# Patient Record
Sex: MALE | Race: WHITE | NOT HISPANIC OR LATINO | Employment: FULL TIME | URBAN - NONMETROPOLITAN AREA
[De-identification: names, ages, dates, MRNs, and addresses within clinical notes are randomized per-mention and may not be internally consistent; named-entity substitution may affect disease eponyms.]

---

## 2018-05-18 ENCOUNTER — OFFICE VISIT (OUTPATIENT)
Dept: OBGYN CLINIC | Facility: CLINIC | Age: 68
End: 2018-05-18
Payer: OTHER MISCELLANEOUS

## 2018-05-18 VITALS
HEIGHT: 72 IN | WEIGHT: 224 LBS | SYSTOLIC BLOOD PRESSURE: 171 MMHG | BODY MASS INDEX: 30.34 KG/M2 | HEART RATE: 66 BPM | DIASTOLIC BLOOD PRESSURE: 101 MMHG

## 2018-05-18 DIAGNOSIS — M75.121 COMPLETE TEAR OF RIGHT ROTATOR CUFF: Primary | ICD-10-CM

## 2018-05-18 PROCEDURE — 99203 OFFICE O/P NEW LOW 30 MIN: CPT | Performed by: ORTHOPAEDIC SURGERY

## 2018-05-18 RX ORDER — CHLORHEXIDINE GLUCONATE 4 G/100ML
SOLUTION TOPICAL DAILY PRN
Status: CANCELLED | OUTPATIENT
Start: 2018-05-18

## 2018-05-18 RX ORDER — NAPROXEN 500 MG/1
500 TABLET ORAL 2 TIMES DAILY WITH MEALS
COMMUNITY
End: 2018-08-13 | Stop reason: HOSPADM

## 2018-05-18 RX ORDER — OMEPRAZOLE 20 MG/1
20 CAPSULE, DELAYED RELEASE ORAL DAILY
COMMUNITY

## 2018-05-18 NOTE — PATIENT INSTRUCTIONS
Pre-Surgery Instructions:   Medication Instructions    naproxen (NAPROSYN) 500 mg tablet Stop taking 3 days prior to surgery   Rotator Cuff Tear Repair   WHAT YOU NEED TO KNOW:   Rotator cuff repair is surgery to fix a tear in one or more of your rotator cuff tendons  A tendon is a cord of tough tissue that connects your muscles to your bones  The rotator cuff is made up of a group of muscles and tendons that hold the shoulder joint in place  DISCHARGE INSTRUCTIONS:   Medicines:   · Antibiotics: This medicine is given to fight or prevent an infection caused by bacteria  Always take your antibiotics exactly as ordered by your healthcare provider  Do not stop taking your medicine unless directed by your healthcare provider  Never save antibiotics or take leftover antibiotics that were given to you for another illness  · Pain medicine: You may be given medicine to take away or decrease pain  Do not wait until the pain is severe before you take your medicine  · NSAIDs , such as ibuprofen, help decrease swelling, pain, and fever  This medicine is available with or without a doctor's order  NSAIDs can cause stomach bleeding or kidney problems in certain people  If you take blood thinner medicine, always ask your healthcare provider if NSAIDs are safe for you  Always read the medicine label and follow directions  · Take your medicine as directed  Contact your healthcare provider if you think your medicine is not helping or if you have side effects  Tell him or her if you are allergic to any medicine  Keep a list of the medicines, vitamins, and herbs you take  Include the amounts, and when and why you take them  Bring the list or the pill bottles to follow-up visits  Carry your medicine list with you in case of an emergency  Follow up with your healthcare provider as directed: Ask when you should return to have your stitches taken out   Write down your questions so you remember to ask them during your visits  Care for your wound:  Keep your shoulder wound clean and dry  Ask how to care for the wound, and if you may get it wet  Ice your shoulder: Ice may decrease pain, swelling, and muscle spasms  Use an ice pack, or put crushed ice in a plastic bag  Cover it with a towel and place it on your shoulder for 15 to 20 minutes every hour or as directed  Use a sling: You may need to use an abduction immobilizer sling for 4 to 6 weeks after surgery  This sling stops your arm from moving  The pillow attached to the sling holds your arm away from your body  This position decreases pressure on your wound, and helps blood flow to the surgery area, which may help the wound heal    Physical therapy:   · Your physical therapy may begin soon after surgery  At first, a physical therapist will work with you to do safe exercises that will allow your rotator cuff to heal  Do not use your arm to lift anything  Do not use your arm to move around, push yourself up from lying down, or to change positions  After a few weeks, the therapist may suggest therapy in the pool  The water allows you to move your arm with very little stress on your shoulder  · Over 2 to 3 months, you will do exercises that include using your shoulder more  You will begin exercises such as raising and stretching your arm  Do only those exercises that you have been told to do, and only as often as you are told to do them  Over time, you will begin doing exercises that make your shoulder muscles stronger  After 4 to 6 months, you may be able to begin activities that require you to lift your arm overhead, such as tennis and other racquet sports  It may take up to a year to return to all of your regular daily activities after you have a rotator cuff tear repair  Contact your healthcare provider if:   · Your surgery area is swollen, red, or has pus coming from it  · You have chills, a cough, or feel weak and achy  · You have a fever      · You have stiffness or pain in your shoulder that is worse and making you stop your physical therapy  · You are vomiting  · Your skin is itchy, swollen, or has a rash  · You have questions or concerns about your condition or care  Seek care immediately or call 911 if:   · You suddenly have shortness of breath  · The stitches on your shoulder wound come apart  · You have new shoulder pain that does not go away, even after you take pain medicine  · You have sudden numbness or tingling down your arm  © 2017 [employer]  Information is for End User's use only and may not be sold, redistributed or otherwise used for commercial purposes  All illustrations and images included in CareNotes® are the copyrighted property of [employer]  or [address]  The above information is an  only  It is not intended as medical advice for individual conditions or treatments  Talk to your doctor, nurse or pharmacist before following any medical regimen to see if it is safe and effective for you     omeprazole (PriLOSEC) 20 mg delayed release capsule per anesthesia guidelines

## 2018-05-18 NOTE — PROGRESS NOTES
Chief Complaint  Right shoulder pain 2 weeks    History Of Presenting Illness  Radha Watt 1950 presents with right shoulder pain 2 weeks  Patient is left handed   Patient was coming down the ladder 2 weeks ago at work  He slipped and fell and to stop himself falling he grabbed the railing with his aright hand  Patient developed acute pain and weakness in his right shoulder  Patient was seen seen and treated by workman's comp  Patient presents for evaluation with an MRI  His pain and swelling has decreased in the right shoulder  His range of motion has improved  Current Medications  Current Outpatient Prescriptions   Medication Sig Dispense Refill    naproxen (NAPROSYN) 500 mg tablet Take 500 mg by mouth 2 (two) times a day with meals      omeprazole (PriLOSEC) 20 mg delayed release capsule Take 20 mg by mouth daily       No current facility-administered medications for this visit  Current Problems    Active Problems: There are no active problems to display for this patient          Review of Systems:    General: negative for - chills, fatigue, fever,  weight gain or weight loss  Psychological: negative for - anxiety, behavioral disorder, concentration difficulties, decreased libido, depression, irritability, memory difficulties, mood swings, sleep disturbances or suicidal ideation  Ophthalmic: negative for - blurry vision, decreased vision, double vision,  ENT: negative for - hearing difficulties , nasal congestion, nasal discharge, oral lesions, sinus pain, sneezing, sore throat, tinnitus, vertigo or visual changes  Allergy and Immunology: negative for - hives, insect bite sensitivity,  Hematological and Lymphatic: negative for - bleeding problems, blood clots,bruising, swollen lymph nodes  Endocrine: negative for - hair pattern changes, hot flashes, malaise/lethargy, mood swings, palpitations, polydipsia/polyuria, skin changes, temperature intolerance or unexpected weight change  Respiratory: negative for - cough, hemoptysis, orthopnea, shortness of breath, sputum changes, tachypnea or wheezing  Cardiovascular: negative for - chest pain, dyspnea on exertion, edema, irregular heartbeat, murmur, orthopnea, palpitations, paroxysmal nocturnal dyspnea or rapid heart rate  Gastrointestinal: negative for - abdominal pain,  blood in stools, change in bowel habits, change in stools, constipation, diarrhea, gas/bloating, heartburn, hematemesis, melena, nausea/vomiting, stool incontinence or swallowing difficulty/pain  Genito-Urinary: negative for - dysuria, incontinence, irregular/heavy menses or urinary frequency/urgency  Musculoskeletal: negative for - gait disturbance, joint pain, joint stiffness, joint swelling, muscle pain, muscular weakness  Dermatological: negative fo lumps, mole changes, nail changes, pruritus, rash and skin lesion changes  Neurological: negative for confusion, dizziness, headaches, impaired coordination/balance, memory loss, numbness/tingling, seizures, speech problems, tremors or weakness    Past Medical History:   Past Medical History:   Diagnosis Date    Stomach disorder        Past Surgical History:   No past surgical history on file  Family History:  Family history reviewed and non-contributory  Family History   Problem Relation Age of Onset    Cancer Mother     Lung disease Father     Cancer Sister        Social History:  Social History     Social History    Marital status: /Civil Union     Spouse name: N/A    Number of children: N/A    Years of education: N/A     Social History Main Topics    Smoking status: Former Smoker    Smokeless tobacco: Never Used    Alcohol use None    Drug use: Unknown    Sexual activity: Not Asked     Other Topics Concern    None     Social History Narrative    None       Allergies:    Allergies   Allergen Reactions    Penicillins Hives           Physical ExaminationBP (!) 171/101   Pulse 66   Ht 6' (1 829 m)   Wt 102 kg (224 lb)   BMI 30 38 kg/m²   Gen: Alert and oriented to person, place, time  HEENT: EOMI, eyes clear, moist mucus membranes, hearing intact  Respiratory: Bilateral chest rise  No audible wheezing found  Cardiovascular: Regular Rate and Rhythm  Abdomen: soft nontender/nondistended    Orthopedic Exam  Right shoulder no obvious swelling or deformity  Slight wasting of his cuff muscles  Right shoulder active forward flexion of 90° AB duction of 80°  Cuff strength right shoulder supraspinatus infraspinatus external rotation 2+ out of 5  Internal rotation strength maintained  MRI shows massive cuff tear with retraction of the glenoid rim          Impression  Right shoulder complete rotator cuff tear      Plan    Discussed treatment with the patient  Patient is very active and still at work my recommendation is surgical repair of the rotator cuff  Given the degree of retraction I may not be able to obtain a complete repair and he might require further intervention down the line    Discussed treatment options with the patient  Options are to do nothing, continue nonoperative measures, last resort  is surgical intervention  This patient has failed nonoperative measures and has  opted for surgical intervention  Risk and  benefits of treatment options discussed in detail  Risks of surgery include risk of infection, bleeding, injury to the vessels and risk of failure of the procedure, potential risk of loss of life and limb  After weighing up all treatment options available  Patient has  opted for surgical intervention and informed consent obtained          Cristobal Chen MD        Portions of the record may have been created with voice recognition software   Occasional wrong word or "sound a like" substitutions may have occurred due to the inherent limitations of voice recognition software   Read the chart carefully and recognize, using context, where substitutions have occurred

## 2018-05-18 NOTE — H&P
Chief Complaint  Right shoulder pain 2 weeks    History Of Presenting Illness  Rajan Cruz 1950 presents with right shoulder pain 2 weeks  Patient is left handed   Patient was coming down the ladder 2 weeks ago at work  He slipped and fell and to stop himself falling he grabbed the railing with his aright hand  Patient developed acute pain and weakness in his right shoulder  Patient was seen seen and treated by workman's comp  Patient presents for evaluation with an MRI  His pain and swelling has decreased in the right shoulder  His range of motion has improved  Current Medications  Current Outpatient Prescriptions   Medication Sig Dispense Refill    naproxen (NAPROSYN) 500 mg tablet Take 500 mg by mouth 2 (two) times a day with meals      omeprazole (PriLOSEC) 20 mg delayed release capsule Take 20 mg by mouth daily       No current facility-administered medications for this visit  Current Problems    Active Problems: There are no active problems to display for this patient          Review of Systems:    General: negative for - chills, fatigue, fever,  weight gain or weight loss  Psychological: negative for - anxiety, behavioral disorder, concentration difficulties, decreased libido, depression, irritability, memory difficulties, mood swings, sleep disturbances or suicidal ideation  Ophthalmic: negative for - blurry vision, decreased vision, double vision,  ENT: negative for - hearing difficulties , nasal congestion, nasal discharge, oral lesions, sinus pain, sneezing, sore throat, tinnitus, vertigo or visual changes  Allergy and Immunology: negative for - hives, insect bite sensitivity,  Hematological and Lymphatic: negative for - bleeding problems, blood clots,bruising, swollen lymph nodes  Endocrine: negative for - hair pattern changes, hot flashes, malaise/lethargy, mood swings, palpitations, polydipsia/polyuria, skin changes, temperature intolerance or unexpected weight change  Respiratory: negative for - cough, hemoptysis, orthopnea, shortness of breath, sputum changes, tachypnea or wheezing  Cardiovascular: negative for - chest pain, dyspnea on exertion, edema, irregular heartbeat, murmur, orthopnea, palpitations, paroxysmal nocturnal dyspnea or rapid heart rate  Gastrointestinal: negative for - abdominal pain,  blood in stools, change in bowel habits, change in stools, constipation, diarrhea, gas/bloating, heartburn, hematemesis, melena, nausea/vomiting, stool incontinence or swallowing difficulty/pain  Genito-Urinary: negative for - dysuria, incontinence, irregular/heavy menses or urinary frequency/urgency  Musculoskeletal: negative for - gait disturbance, joint pain, joint stiffness, joint swelling, muscle pain, muscular weakness  Dermatological: negative fo lumps, mole changes, nail changes, pruritus, rash and skin lesion changes  Neurological: negative for confusion, dizziness, headaches, impaired coordination/balance, memory loss, numbness/tingling, seizures, speech problems, tremors or weakness    Past Medical History:   Past Medical History:   Diagnosis Date    Stomach disorder        Past Surgical History:   No past surgical history on file  Family History:  Family history reviewed and non-contributory  Family History   Problem Relation Age of Onset    Cancer Mother     Lung disease Father     Cancer Sister        Social History:  Social History     Social History    Marital status: /Civil Union     Spouse name: N/A    Number of children: N/A    Years of education: N/A     Social History Main Topics    Smoking status: Former Smoker    Smokeless tobacco: Never Used    Alcohol use None    Drug use: Unknown    Sexual activity: Not Asked     Other Topics Concern    None     Social History Narrative    None       Allergies:    Allergies   Allergen Reactions    Penicillins Hives           Physical ExaminationBP (!) 171/101   Pulse 66   Ht 6' (1 829 m)   Wt 102 kg (224 lb)   BMI 30 38 kg/m²    Gen: Alert and oriented to person, place, time  HEENT: EOMI, eyes clear, moist mucus membranes, hearing intact  Respiratory: Bilateral chest rise  No audible wheezing found  Cardiovascular: Regular Rate and Rhythm  Abdomen: soft nontender/nondistended    Orthopedic Exam  Right shoulder no obvious swelling or deformity  Slight wasting of his cuff muscles  Right shoulder active forward flexion of 90° AB duction of 80°  Cuff strength right shoulder supraspinatus infraspinatus external rotation 2+ out of 5  Internal rotation strength maintained  MRI shows massive cuff tear with retraction of the glenoid rim          Impression  Right shoulder complete rotator cuff tear      Plan    Discussed treatment with the patient  Patient is very active and still at work my recommendation is surgical repair of the rotator cuff  Given the degree of retraction I may not be able to obtain a complete repair and he might require further intervention down the line    Discussed treatment options with the patient  Options are to do nothing, continue nonoperative measures, last resort  is surgical intervention  This patient has failed nonoperative measures and has  opted for surgical intervention  Risk and  benefits of treatment options discussed in detail  Risks of surgery include risk of infection, bleeding, injury to the vessels and risk of failure of the procedure, potential risk of loss of life and limb  After weighing up all treatment options available  Patient has  opted for surgical intervention and informed consent obtained          Leo Case MD        Portions of the record may have been created with voice recognition software   Occasional wrong word or "sound a like" substitutions may have occurred due to the inherent limitations of voice recognition software   Read the chart carefully and recognize, using context, where substitutions have occurred

## 2018-06-05 ENCOUNTER — HOSPITAL ENCOUNTER (OUTPATIENT)
Dept: RADIOLOGY | Facility: HOSPITAL | Age: 68
Discharge: HOME | End: 2018-06-05
Attending: ORTHOPAEDIC SURGERY
Payer: COMMERCIAL

## 2018-06-05 ENCOUNTER — HOSPITAL ENCOUNTER (OUTPATIENT)
Dept: NON INVASIVE DIAGNOSTICS | Facility: HOSPITAL | Age: 68
Discharge: HOME | End: 2018-06-05
Attending: ORTHOPAEDIC SURGERY
Payer: COMMERCIAL

## 2018-06-05 ENCOUNTER — APPOINTMENT (OUTPATIENT)
Dept: LAB | Facility: HOSPITAL | Age: 68
End: 2018-06-05
Attending: ORTHOPAEDIC SURGERY
Payer: COMMERCIAL

## 2018-06-05 DIAGNOSIS — M75.121 COMPLETE TEAR OF RIGHT ROTATOR CUFF: ICD-10-CM

## 2018-06-05 LAB
ANION GAP SERPL CALCULATED.3IONS-SCNC: 9 MMOL/L (ref 4–13)
APTT PPP: 32 SECONDS (ref 24–36)
BASOPHILS # BLD AUTO: 0.04 THOUSANDS/ΜL (ref 0–0.1)
BASOPHILS NFR BLD AUTO: 1 % (ref 0–1)
BILIRUB UR QL STRIP: NEGATIVE
BUN SERPL-MCNC: 13 MG/DL (ref 5–25)
CALCIUM SERPL-MCNC: 9.1 MG/DL (ref 8.3–10.1)
CHLORIDE SERPL-SCNC: 104 MMOL/L (ref 100–108)
CLARITY UR: CLEAR
CO2 SERPL-SCNC: 26 MMOL/L (ref 21–32)
COLOR UR: YELLOW
CREAT SERPL-MCNC: 1.1 MG/DL (ref 0.6–1.3)
EOSINOPHIL # BLD AUTO: 0.08 THOUSAND/ΜL (ref 0–0.61)
EOSINOPHIL NFR BLD AUTO: 2 % (ref 0–6)
ERYTHROCYTE [DISTWIDTH] IN BLOOD BY AUTOMATED COUNT: 13.4 % (ref 11.6–15.1)
GFR SERPL CREATININE-BSD FRML MDRD: 70 ML/MIN/1.73SQ M
GLUCOSE SERPL-MCNC: 105 MG/DL (ref 65–140)
GLUCOSE UR STRIP-MCNC: NEGATIVE MG/DL
HCT VFR BLD AUTO: 42.8 % (ref 36.5–49.3)
HGB BLD-MCNC: 15.3 G/DL (ref 12–17)
HGB UR QL STRIP.AUTO: NEGATIVE
INR PPP: 1.14 (ref 0.86–1.17)
KETONES UR STRIP-MCNC: NEGATIVE MG/DL
LEUKOCYTE ESTERASE UR QL STRIP: NEGATIVE
LYMPHOCYTES # BLD AUTO: 1.13 THOUSANDS/ΜL (ref 0.6–4.47)
LYMPHOCYTES NFR BLD AUTO: 22 % (ref 14–44)
MCH RBC QN AUTO: 33 PG (ref 26.8–34.3)
MCHC RBC AUTO-ENTMCNC: 35.7 G/DL (ref 31.4–37.4)
MCV RBC AUTO: 92 FL (ref 82–98)
MONOCYTES # BLD AUTO: 0.47 THOUSAND/ΜL (ref 0.17–1.22)
MONOCYTES NFR BLD AUTO: 9 % (ref 4–12)
NEUTROPHILS # BLD AUTO: 3.43 THOUSANDS/ΜL (ref 1.85–7.62)
NEUTS SEG NFR BLD AUTO: 67 % (ref 43–75)
NITRITE UR QL STRIP: NEGATIVE
PH UR STRIP.AUTO: 5.5 [PH] (ref 4.5–8)
PLATELET # BLD AUTO: 239 THOUSANDS/UL (ref 149–390)
PMV BLD AUTO: 9.4 FL (ref 8.9–12.7)
POTASSIUM SERPL-SCNC: 4.8 MMOL/L (ref 3.5–5.3)
PROT UR STRIP-MCNC: NEGATIVE MG/DL
PROTHROMBIN TIME: 14.1 SECONDS (ref 11.8–14.2)
RBC # BLD AUTO: 4.63 MILLION/UL (ref 3.88–5.62)
SODIUM SERPL-SCNC: 139 MMOL/L (ref 136–145)
SP GR UR STRIP.AUTO: 1.02 (ref 1–1.03)
UROBILINOGEN UR QL STRIP.AUTO: 0.2 E.U./DL
WBC # BLD AUTO: 5.15 THOUSAND/UL (ref 4.31–10.16)

## 2018-06-05 PROCEDURE — 93005 ELECTROCARDIOGRAM TRACING: CPT

## 2018-06-05 PROCEDURE — 85610 PROTHROMBIN TIME: CPT

## 2018-06-05 PROCEDURE — 85730 THROMBOPLASTIN TIME PARTIAL: CPT

## 2018-06-05 PROCEDURE — 81003 URINALYSIS AUTO W/O SCOPE: CPT | Performed by: ORTHOPAEDIC SURGERY

## 2018-06-05 PROCEDURE — 85025 COMPLETE CBC W/AUTO DIFF WBC: CPT

## 2018-06-05 PROCEDURE — 71046 X-RAY EXAM CHEST 2 VIEWS: CPT

## 2018-06-05 PROCEDURE — 36415 COLL VENOUS BLD VENIPUNCTURE: CPT

## 2018-06-05 PROCEDURE — 80048 BASIC METABOLIC PNL TOTAL CA: CPT

## 2018-06-06 LAB
ATRIAL RATE: 59 BPM
P AXIS: 47 DEGREES
PR INTERVAL: 144 MS
QRS AXIS: -23 DEGREES
QRSD INTERVAL: 88 MS
QT INTERVAL: 412 MS
QTC INTERVAL: 407 MS
T WAVE AXIS: 26 DEGREES
VENTRICULAR RATE: 59 BPM

## 2018-06-06 PROCEDURE — 93010 ELECTROCARDIOGRAM REPORT: CPT | Performed by: INTERNAL MEDICINE

## 2018-07-20 ENCOUNTER — OFFICE VISIT (OUTPATIENT)
Dept: OBGYN CLINIC | Facility: CLINIC | Age: 68
End: 2018-07-20
Payer: OTHER MISCELLANEOUS

## 2018-07-20 VITALS
HEART RATE: 73 BPM | BODY MASS INDEX: 29.39 KG/M2 | WEIGHT: 217 LBS | HEIGHT: 72 IN | DIASTOLIC BLOOD PRESSURE: 84 MMHG | SYSTOLIC BLOOD PRESSURE: 188 MMHG

## 2018-07-20 DIAGNOSIS — M75.121 COMPLETE TEAR OF RIGHT ROTATOR CUFF: Primary | ICD-10-CM

## 2018-07-20 PROCEDURE — 99213 OFFICE O/P EST LOW 20 MIN: CPT | Performed by: ORTHOPAEDIC SURGERY

## 2018-07-20 NOTE — LETTER
July 20, 2018     Patient: Penny Perez   YOB: 1950   Date of Visit: 7/20/2018       To Whom It May Concern: It is my medical opinion that Penny Perez may return to work on aug 15th 2018  Patient might need surgery on his right shoulder by means of an arthroscopic repair  Patient will continue physical therapy    If you have any questions or concerns, please don't hesitate to call           Sincerely,        Charlene Herrera MD    CC: Penny Perez

## 2018-07-20 NOTE — PATIENT INSTRUCTIONS
Exercises for Shoulder Abduction and Adduction   AMBULATORY CARE:   Shoulder abduction and adduction exercises  work the muscles at the back of your shoulder and your upper back  Before you exercise:  Warm up and stretch before you exercise  Walk or ride a stationary bike for 5 to 10 minutes to help you warm up  Stretching helps increase range of motion  It may also decrease muscle soreness and help prevent another injury  Your healthcare provider will tell you which of the following stretches to do:  · Crossover arm stretch:  Relax your shoulders  Hold your upper arm with the opposite hand  Pull your arm across your chest until you feel a stretch  Hold the stretch for 30 seconds  Return to the starting position  · Shoulder flexion stretch:  Stand facing a wall  Slowly walk your fingers up the wall until you feel a stretch  Hold the stretch for 30 seconds  Return to the starting position  · Sleeper stretch:  Lie on your injured side on a firm, flat surface  Bend the elbow of your injured arm 90° with your hand facing up  Use your arm that is not injured to slowly push your injured arm down  Stop when you feel a stretch at the back of your injured shoulder  Hold the stretch for 30 seconds  Slowly return to the starting position  Contact your healthcare provider if:   · You have sharp or worsening pain during exercise or at rest     · You have questions or concerns about your shoulder exercises  How to exercise with a weight:  Your healthcare provider will tell you how much weight to use  · Shoulder abduction:  Stand and hold a weight in your hand with your palm facing your body  Slowly raise your arm to the side with your thumb pointing up  Then raise your arm over your head as far as you can without pain  Hold this position for as long as directed  Do not raise your arm over your head unless your healthcare provider says it is okay  · Shoulder adduction:  Lie on your back on a firm surface   Extend your arm out to a "T " Bend your elbow so your forearm in the air  Hold a weight in your hand  Slowly raise your arm toward the ceiling and straighten your elbow  Hold this position for as long as directed  Slowly return to the starting position  How to exercise with an exercise band:   · Shoulder abduction:  Wrap the exercise band around a heavy, stable object near your foot  Grab the band with the hand of your injured shoulder  Keep your arm straight  Slowly raise your arm to the side with your thumb pointing up  Then, slowly pull the band over your head as far as you can without pain  Do not raise your arm over your head unless your healthcare provider says it is okay  Do not let your shoulder shrug  Hold this position for as long as directed  Slowly return to the starting position  · Shoulder adduction:  Wrap the exercise band around a heavy, stable object  Stand and face away from where the band is anchored  Hold each end of the band in both hands with your elbows bent  Your elbows should not be behind your body  Keep your arms parallel to the floor and slowly straighten your elbows  Hold this position for as long as directed  Slowly return to the starting position  Follow up with your physical therapist as directed:  Write down your questions so you remember to ask them at your visits  © 2017 [employer]  Information is for End User's use only and may not be sold, redistributed or otherwise used for commercial purposes  All illustrations and images included in CareNotes® are the copyrighted property of [employer]  or [address]  The above information is an  only  It is not intended as medical advice for individual conditions or treatments  Talk to your doctor, nurse or pharmacist before following any medical regimen to see if it is safe and effective for you

## 2018-07-20 NOTE — PROGRESS NOTES
Chief Complaint  Right shoulder pain and weakness    History Of Presenting Illness  Karla Nicolas 1950 presents with right shoulder pain and weakness due to rotator cuff tear by clinical exam and MRI  Patient was scheduled for an arthroscopic repair  Patient is afraid of surgery and cancel his procedure  Patient presents for evaluation today  He has regained his range of motion  His pain is decreased  His weakness persists      Current Medications  Current Outpatient Prescriptions   Medication Sig Dispense Refill    naproxen (NAPROSYN) 500 mg tablet Take 500 mg by mouth 2 (two) times a day with meals      omeprazole (PriLOSEC) 20 mg delayed release capsule Take 20 mg by mouth daily      valsartan (DIOVAN) 160 mg tablet Take 160 mg by mouth daily       No current facility-administered medications for this visit          Current Problems    Active Problems:   Patient Active Problem List    Diagnosis Date Noted    Complete tear of right rotator cuff 05/18/2018         Review of Systems:    General: negative for - chills, fatigue, fever,  weight gain or weight loss  Psychological: negative for - anxiety, behavioral disorder, concentration difficulties    Neurological: negative for confusion, impaired coordination/balance, memory loss, numbness/tingling, seizures    Past Medical History:   Past Medical History:   Diagnosis Date    GERD (gastroesophageal reflux disease)     Hypertension     Stomach disorder        Past Surgical History:   Past Surgical History:   Procedure Laterality Date    TONSILLECTOMY         Family History:  Family history reviewed and non-contributory  Family History   Problem Relation Age of Onset    Cancer Mother     Lung disease Father     Cancer Sister        Social History:  Social History     Social History    Marital status: /Civil Union     Spouse name: N/A    Number of children: N/A    Years of education: N/A     Social History Main Topics    Smoking status: Former Smoker    Smokeless tobacco: Never Used    Alcohol use Yes      Comment: social    Drug use: Unknown    Sexual activity: Not Asked     Other Topics Concern    None     Social History Narrative    None       Allergies: Allergies   Allergen Reactions    Penicillins Hives           Physical ExaminationBP (!) 188/84   Pulse 73   Ht 6' (1 829 m)   Wt 98 4 kg (217 lb)   BMI 29 43 kg/m²   Gen: Alert and oriented to person, place, time  HEENT: EOMI, eyes clear, moist mucus membranes, hearing intact  Respiratory: Bilateral chest rise  No audible wheezing found  Cardiovascular: Regular Rate and Rhythm  Abdomen: soft nontender/nondistended    Orthopedic Exam  Right shoulder no obvious swelling or deformity  Excellent range of motion  Cuff strength 4/5  Right shows a complete tear of the supraspinatus anterior fibers without any retraction          Impression  Right shoulder rotator cuff tear        Plan    Discussed treatment with the patient and his wife  Patient is right-handed and extremely active and a   My recommendation is a surgical repair  Patient and his wife will think about the options and let me know next week  Patient allowed to go back to his job August 15th, 2018  If the shoulder becomes more symptomatic while at work he will need surgical repair at that stage  Patient will continue physical therapy    Marry Matamoros MD        Portions of the record may have been created with voice recognition software   Occasional wrong word or "sound a like" substitutions may have occurred due to the inherent limitations of voice recognition software   Read the chart carefully and recognize, using context, where substitutions have occurred

## 2018-07-23 ENCOUNTER — TELEPHONE (OUTPATIENT)
Dept: OBGYN CLINIC | Facility: HOSPITAL | Age: 68
End: 2018-07-23

## 2018-07-23 NOTE — TELEPHONE ENCOUNTER
Patient is calling to say he would like to proceed with having surgery  [facility] next [facility] be?

## 2018-07-26 ENCOUNTER — TELEPHONE (OUTPATIENT)
Dept: OBGYN CLINIC | Facility: HOSPITAL | Age: 68
End: 2018-07-26

## 2018-07-26 NOTE — TELEPHONE ENCOUNTER
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

## 2018-08-06 NOTE — PRE-PROCEDURE INSTRUCTIONS
No outpatient prescriptions have been marked as taking for the 8/13/18 encounter [facility] Encounter)

## 2018-08-11 ENCOUNTER — ANESTHESIA EVENT (OUTPATIENT)
Dept: PERIOP | Facility: HOSPITAL | Age: 68
End: 2018-08-11
Payer: COMMERCIAL

## 2018-08-13 ENCOUNTER — HOSPITAL ENCOUNTER (OUTPATIENT)
Facility: HOSPITAL | Age: 68
Setting detail: OUTPATIENT SURGERY
Discharge: HOME | End: 2018-08-13
Attending: ORTHOPAEDIC SURGERY | Admitting: ORTHOPAEDIC SURGERY
Payer: COMMERCIAL

## 2018-08-13 ENCOUNTER — ANESTHESIA (OUTPATIENT)
Dept: PERIOP | Facility: HOSPITAL | Age: 68
End: 2018-08-13
Payer: COMMERCIAL

## 2018-08-13 VITALS
HEART RATE: 66 BPM | DIASTOLIC BLOOD PRESSURE: 67 MMHG | RESPIRATION RATE: 18 BRPM | OXYGEN SATURATION: 96 % | TEMPERATURE: 97.3 F | SYSTOLIC BLOOD PRESSURE: 125 MMHG

## 2018-08-13 DIAGNOSIS — M75.121 COMPLETE TEAR OF RIGHT ROTATOR CUFF: Primary | ICD-10-CM

## 2018-08-13 PROCEDURE — C1713 ANCHOR/SCREW BN/BN,TIS/BN: HCPCS | Performed by: ORTHOPAEDIC SURGERY

## 2018-08-13 PROCEDURE — 29828 SHO ARTHRS SRG BICP TENODSIS: CPT | Performed by: PHYSICIAN ASSISTANT

## 2018-08-13 PROCEDURE — 29827 SHO ARTHRS SRG RT8TR CUF RPR: CPT | Performed by: PHYSICIAN ASSISTANT

## 2018-08-13 PROCEDURE — 29827 SHO ARTHRS SRG RT8TR CUF RPR: CPT | Performed by: ORTHOPAEDIC SURGERY

## 2018-08-13 PROCEDURE — 29828 SHO ARTHRS SRG BICP TENODSIS: CPT | Performed by: ORTHOPAEDIC SURGERY

## 2018-08-13 DEVICE — ANCHOR SUT 4.5 X 14MM BCMPS CRKSCR FLLY THRD: Type: IMPLANTABLE DEVICE | Site: SHOULDER | Status: FUNCTIONAL

## 2018-08-13 DEVICE — IMPLANTABLE DEVICE: Type: IMPLANTABLE DEVICE | Site: SHOULDER | Status: FUNCTIONAL

## 2018-08-13 RX ORDER — FENTANYL CITRATE/PF 50 MCG/ML
50 SYRINGE (ML) INJECTION
Status: DISCONTINUED | OUTPATIENT
Start: 2018-08-13 | End: 2018-08-13 | Stop reason: HOSPADM

## 2018-08-13 RX ORDER — CLINDAMYCIN PHOSPHATE 900 MG/50ML
900 INJECTION INTRAVENOUS ONCE
Status: DISCONTINUED | OUTPATIENT
Start: 2018-08-13 | End: 2018-08-13

## 2018-08-13 RX ORDER — PROPOFOL 10 MG/ML
INJECTION, EMULSION INTRAVENOUS AS NEEDED
Status: DISCONTINUED | OUTPATIENT
Start: 2018-08-13 | End: 2018-08-13 | Stop reason: SURG

## 2018-08-13 RX ORDER — KETOROLAC TROMETHAMINE 30 MG/ML
INJECTION, SOLUTION INTRAMUSCULAR; INTRAVENOUS AS NEEDED
Status: DISCONTINUED | OUTPATIENT
Start: 2018-08-13 | End: 2018-08-13 | Stop reason: SURG

## 2018-08-13 RX ORDER — ONDANSETRON 2 MG/ML
INJECTION INTRAMUSCULAR; INTRAVENOUS AS NEEDED
Status: DISCONTINUED | OUTPATIENT
Start: 2018-08-13 | End: 2018-08-13 | Stop reason: SURG

## 2018-08-13 RX ORDER — OXYCODONE HYDROCHLORIDE AND ACETAMINOPHEN 5; 325 MG/1; MG/1
1 TABLET ORAL EVERY 4 HOURS PRN
Qty: 30 TABLET | Refills: 0 | Status: SHIPPED | OUTPATIENT
Start: 2018-08-13 | End: 2018-09-21 | Stop reason: CLARIF

## 2018-08-13 RX ORDER — ROPIVACAINE HYDROCHLORIDE 5 MG/ML
INJECTION, SOLUTION EPIDURAL; INFILTRATION; PERINEURAL AS NEEDED
Status: DISCONTINUED | OUTPATIENT
Start: 2018-08-13 | End: 2018-08-13 | Stop reason: SURG

## 2018-08-13 RX ORDER — ONDANSETRON 2 MG/ML
4 INJECTION INTRAMUSCULAR; INTRAVENOUS ONCE AS NEEDED
Status: DISCONTINUED | OUTPATIENT
Start: 2018-08-13 | End: 2018-08-13 | Stop reason: HOSPADM

## 2018-08-13 RX ORDER — FENTANYL CITRATE 50 UG/ML
INJECTION, SOLUTION INTRAMUSCULAR; INTRAVENOUS AS NEEDED
Status: DISCONTINUED | OUTPATIENT
Start: 2018-08-13 | End: 2018-08-13 | Stop reason: SURG

## 2018-08-13 RX ORDER — SODIUM CHLORIDE, SODIUM LACTATE, POTASSIUM CHLORIDE, CALCIUM CHLORIDE 600; 310; 30; 20 MG/100ML; MG/100ML; MG/100ML; MG/100ML
125 INJECTION, SOLUTION INTRAVENOUS CONTINUOUS
Status: DISCONTINUED | OUTPATIENT
Start: 2018-08-13 | End: 2018-08-13 | Stop reason: HOSPADM

## 2018-08-13 RX ORDER — ROCURONIUM BROMIDE 10 MG/ML
INJECTION, SOLUTION INTRAVENOUS AS NEEDED
Status: DISCONTINUED | OUTPATIENT
Start: 2018-08-13 | End: 2018-08-13 | Stop reason: SURG

## 2018-08-13 RX ORDER — OXYCODONE HYDROCHLORIDE AND ACETAMINOPHEN 5; 325 MG/1; MG/1
2 TABLET ORAL EVERY 4 HOURS PRN
Status: DISCONTINUED | OUTPATIENT
Start: 2018-08-13 | End: 2018-08-13 | Stop reason: HOSPADM

## 2018-08-13 RX ORDER — MIDAZOLAM HYDROCHLORIDE 1 MG/ML
INJECTION INTRAMUSCULAR; INTRAVENOUS AS NEEDED
Status: DISCONTINUED | OUTPATIENT
Start: 2018-08-13 | End: 2018-08-13 | Stop reason: SURG

## 2018-08-13 RX ORDER — LIDOCAINE HYDROCHLORIDE AND EPINEPHRINE 10; 10 MG/ML; UG/ML
INJECTION, SOLUTION INFILTRATION; PERINEURAL AS NEEDED
Status: DISCONTINUED | OUTPATIENT
Start: 2018-08-13 | End: 2018-08-13 | Stop reason: HOSPADM

## 2018-08-13 RX ORDER — MEPERIDINE HYDROCHLORIDE 25 MG/ML
12.5 INJECTION INTRAMUSCULAR; INTRAVENOUS; SUBCUTANEOUS ONCE AS NEEDED
Status: DISCONTINUED | OUTPATIENT
Start: 2018-08-13 | End: 2018-08-13 | Stop reason: HOSPADM

## 2018-08-13 RX ORDER — SUCCINYLCHOLINE CHLORIDE 20 MG/ML
INJECTION INTRAMUSCULAR; INTRAVENOUS AS NEEDED
Status: DISCONTINUED | OUTPATIENT
Start: 2018-08-13 | End: 2018-08-13 | Stop reason: SURG

## 2018-08-13 RX ORDER — MAGNESIUM HYDROXIDE 1200 MG/15ML
LIQUID ORAL AS NEEDED
Status: DISCONTINUED | OUTPATIENT
Start: 2018-08-13 | End: 2018-08-13 | Stop reason: HOSPADM

## 2018-08-13 RX ORDER — LIDOCAINE HYDROCHLORIDE 10 MG/ML
INJECTION, SOLUTION INFILTRATION; PERINEURAL AS NEEDED
Status: DISCONTINUED | OUTPATIENT
Start: 2018-08-13 | End: 2018-08-13 | Stop reason: SURG

## 2018-08-13 RX ADMIN — LIDOCAINE HYDROCHLORIDE 50 MG: 10 INJECTION, SOLUTION INFILTRATION; PERINEURAL at 09:44

## 2018-08-13 RX ADMIN — ROPIVACAINE HYDROCHLORIDE 30 ML: 5 INJECTION, SOLUTION EPIDURAL; INFILTRATION; PERINEURAL at 09:10

## 2018-08-13 RX ADMIN — KETOROLAC TROMETHAMINE 30 MG: 30 INJECTION, SOLUTION INTRAMUSCULAR at 12:20

## 2018-08-13 RX ADMIN — ONDANSETRON HYDROCHLORIDE 4 MG: 2 INJECTION, SOLUTION INTRAVENOUS at 09:55

## 2018-08-13 RX ADMIN — SODIUM CHLORIDE, SODIUM LACTATE, POTASSIUM CHLORIDE, AND CALCIUM CHLORIDE: .6; .31; .03; .02 INJECTION, SOLUTION INTRAVENOUS at 10:45

## 2018-08-13 RX ADMIN — DEXAMETHASONE SODIUM PHOSPHATE 4 MG: 10 INJECTION INTRAMUSCULAR; INTRAVENOUS at 09:55

## 2018-08-13 RX ADMIN — MIDAZOLAM HYDROCHLORIDE 2 MG: 1 INJECTION, SOLUTION INTRAMUSCULAR; INTRAVENOUS at 09:08

## 2018-08-13 RX ADMIN — SODIUM CHLORIDE, SODIUM LACTATE, POTASSIUM CHLORIDE, AND CALCIUM CHLORIDE 125 ML/HR: .6; .31; .03; .02 INJECTION, SOLUTION INTRAVENOUS at 08:15

## 2018-08-13 RX ADMIN — ROCURONIUM BROMIDE 20 MG: 10 INJECTION INTRAVENOUS at 10:08

## 2018-08-13 RX ADMIN — SUCCINYLCHOLINE CHLORIDE 100 MG: 20 INJECTION, SOLUTION INTRAMUSCULAR; INTRAVENOUS at 09:44

## 2018-08-13 RX ADMIN — PROPOFOL 200 MG: 10 INJECTION, EMULSION INTRAVENOUS at 09:44

## 2018-08-13 RX ADMIN — SODIUM CHLORIDE 900 MG: 9 INJECTION, SOLUTION INTRAVENOUS at 09:47

## 2018-08-13 RX ADMIN — FENTANYL CITRATE 100 MCG: 50 INJECTION, SOLUTION INTRAMUSCULAR; INTRAVENOUS at 09:08

## 2018-08-13 NOTE — DISCHARGE INSTRUCTIONS
Shoulder Arthroscopy   WHAT YOU SHOULD KNOW:   Shoulder arthroscopy is a surgery to examine or repair your damaged or diseased shoulder joint  AFTER YOU LEAVE:   Medicines:   · Pain medicine: You may be given a prescription medicine to decrease pain  Do not wait until the pain is severe before you take this medicine  · Take your medicine as directed  Call your healthcare provider if you think your medicine is not helping or if you have side effects  Tell him if you are allergic to any medicine  Keep a list of the medicines, vitamins, and herbs you take  Include the amounts, and when and why you take them  Bring the list or the pill bottles to follow-up visits  Carry your medicine list with you in case of an emergency  Follow up with your primary healthcare provider or orthopedic surgeon as directed: You will need to return to have your shoulder checked and stitches removed  Write down your questions so you remember to ask them during your visits  Wound care:   · Wash your hands before and after you care for your incision wound  This will help prevent an infection  · Carefully wash the wound as directed  Dry the area and put on new, clean bandages as directed  Change your bandages when they get wet or dirty  · If you have steri-strips (thin strips of tape) over the wound, do not pull them off  Let them fall off by themselves  · Keep the stitches clean and dry  Do not trim or shorten the ends of your stitches  If they are rubbing on your clothing, you can put a soft gauze bandage between the stitches and your clothes  Self-care:   · Use ice:  Ice helps decrease swelling and pain  Ice may also help prevent tissue damage  Use an ice pack, or put crushed ice in a plastic bag  Cover it with a towel and place it on your shoulder for 15 to 20 minutes every hour or as directed  · Use an arm sling or a brace as directed:   You may need to wear a sling or brace to keep your shoulder close to your body and keep it from moving  This may help your shoulder heal faster and be more comfortable  Wear your sling or brace all the time, even while you sleep, until you are told it is okay to remove it  You can remove your sling or brace to bathe  · Ask about bathing:  Do not let your affected shoulder get wet unless your primary healthcare provider says it is okay  Ask your primary healthcare provider when you can bathe or swim  · Limit activities:  Do not use your arm to lift, pull, or push  Ask when you can return to sports or physical activity  Exercises:   · Home exercises:  After your surgery, you may be asked to do light and easy exercises at first  Do not bend forward from the waist or stretch your arm across your chest in front  Do not stretch your arm behind your back  You may be able to do more as you get stronger and as the pain decreases  Follow exercise instructions from your primary healthcare provider or orthopedic surgeon  · Physical therapy:  A physical therapist can teach you safe exercises to help improve movement and strength, and to decrease pain  Contact your primary healthcare provider or orthopedic surgeon if:   · You have a fever  · You have pain and swelling in your shoulder even after you take your medicines  · Your skin is itchy, swollen, or has a rash  · You have questions or concerns about your condition or care  Seek care immediately or call 911 if:   · You have chest pain or shortness of breath  · You fall and injure your shoulder  · Blood soaks through your bandage  · Any part of your arm is numb, tingly, cool to the touch, blue, or pale  · Your incision wounds are swollen, red, or have pus coming from them  · Your stitches come apart    Talk to your doctor, nurse or pharmacist before following any medical regimen to see if it is safe and effective for you      Please call Dr Mieky Lopez office with any Problems  [address] [facility]  [phone]          ELEVATE LIMB  ICE SURGICAL SITE EVERY 4 HOURS TO AFFECTED SITE  KEEP DRESSINGS CLEAN AND INTACT  CALL DR PEMBERTON AT [phone] TO SCHEDULE POSTOP APPOINTMENT  CALL DR PEMBERTON AT  [phone] FOR ANY CONCERNS OR QUESTIONS OR PROBLEMS

## 2018-08-13 NOTE — ANESTHESIA POSTPROCEDURE EVALUATION
Post-Op Assessment Note      CV Status:  Stable    Mental Status:  Alert and awake    Hydration Status:  Euvolemic    PONV Controlled:  Controlled    Airway Patency:  Patent    Post Op Vitals Reviewed: Yes          Staff: Anesthesiologist, CRNA           BP  134/71   Temp  97 5   Pulse  75   Resp   18   SpO2   97%

## 2018-08-13 NOTE — ANESTHESIA PREPROCEDURE EVALUATION
Review of Systems/Medical History      No history of anesthetic complications     Cardiovascular  Hypertension ,    Pulmonary  Negative pulmonary ROS        GI/Hepatic    GERD ,        Negative  ROS        Endo/Other  Negative endo/other ROS      GYN  Negative gynecology ROS          Hematology  Negative hematology ROS      Musculoskeletal  Negative musculoskeletal ROS   Comment: Right rotator cuff tear      Neurology  Negative neurology ROS      Psychology   Negative psychology ROS              Physical Exam    Airway    Mallampati score: II  TM Distance: >3 FB  Neck ROM: full     Dental       Cardiovascular      Pulmonary      Other Findings  Missing lower right molar      Anesthesia Plan  ASA Score- 2     Anesthesia Type- general with ASA Monitors  Additional Monitors:   Airway Plan: ETT  Comment: General anesthesia, endotracheal tube; standard ASA monitors  Risks and benefits discussed with patient; patient consented and agrees to proceed  I saw and evaluated the patient  If seen with CRNA, we have discussed the anesthetic plan and I am in agreement that the plan is appropriate for the patient  Right interscalene nerve block for post-operative pain control - risks discussed, including nerve damage, bleeding, infection  Precautions given  Pt agrees to proceed  Pt informed about valsartan recall  Plan Factors-    Induction- intravenous  Postoperative Plan- Plan for postoperative opioid use  Planned trial extubation    Informed Consent- Anesthetic plan and risks discussed with patient  I personally reviewed this patient with the CRNA  Discussed and agreed on the Anesthesia Plan with the CRNA  Sendy Salmeron

## 2018-08-13 NOTE — OP NOTE
OPERATIVE REPORT  PATIENT NAME: Janet Perez    :  1950  MRN: [id number]  Pt Location: [facility] OR ROOM 01    SURGERY DATE: 2018    Surgeon(s) and Role:     * Artis Mendez MD - Primary     * Alessandra Clark PA-C - Assisting  No qualified resident available    Preop Diagnosis:  Complete tear of right rotator cuff [M75 121]    Post-Op Diagnosis Codes:     * Complete tear of right rotator cuff [M75 121]   massive rotator cuff tear retracted contracted    Procedure(s) (LRB):  SHOULDER ARTHROSCOPY; ROTATOR CUFF REPAIR (Right)    Specimen(s):  * No specimens in log *    Estimated Blood Loss:   Minimal    Drains:       Anesthesia Type:   General w/ Interscalene Block    Operative Indications:  Complete tear of right rotator cuff [M75 121]   massive right shoulder rotator cuff tear    Operative Findings:   supra and infraspinatus stone retracted to the glenoid rim   intact biceps tendon subscap tendon intact teres minor   early OA changes in the glenohumeral joint with frayed labrum synovectomy done     3 site side sutures placed after mobilizing the cuff   footprint medialized by 3 4 mm and combination of simple and mattress sutures placed   reasonable repair obtained   2 double loaded 4 5 bio corkscrews and 1 single SwiveLock used- Arthrex    Complications:   None    Procedure and Technique: All treatment options were discussed with the patient including non-operative and operative treatment options  Patient has failed non-perative treatment and has opted for surgical intervention  Risks, complications and benefits of all treatment options were discussed in detail  The risks of surgical intervention including infection, injury to vessels and nerves  risk of failure to achieve desired results, risk of need for further procedures, potential risk of loss of life and limb were discussed with the patient  Informed consent was obtained from the patient  The operative site was marked and signed  A timeout was performed prior to the procedure  The patient was re-identified ,including name, date of birth, procedure, consent form reviewed, site and laterality    Appropriate antibiotics were administered preoperatively    The Physician Assistant was present for the entire case and provided essential assistance with patient positioning, prep and draping, wound closure, sterile dressing and splint application, all under my direct supervision(there was no resident available to assist with this case)           patient was placed in the lateral decubitus position with all bony problems well padded   standard anterior posterior lateral portals were used     arthroscopy revealed synovitis was resected intact biceps tendon intact subscap tendon complete tear of the supra and infraspinatus retracted to just beyond the glenoid level     stay sutures were placed in the cuff and painstaking release was done both in the joint side and the bursal side     the able to mobilize the cuff to come up to the footprint   3 side-to-side margin convergence sutures were placed with FiberWire     the footprint was medialized and prepared to anchors were placed at the footprint double loaded   2 simple 2 mattress sutures were placed anterior and posteriorly     prior to the cuff repair the biceps was tenodesed and tacked   good solid cuff repair was obtained with cuff covering the footprint and no tension on the repair site   with 2 mattress sutures were then anchored down the lateral row with a SwiveLock excellent fixation was obtained   the biceps tag suture was then sutured through the cuff repair stitches good fixation of the biceps tendon was obtained       I was present for the entire procedure    Patient Disposition:  PACU     SIGNATURE: Levi Beal MD  DATE: August 13, 2018  TIME: 12:26 PM

## 2018-08-13 NOTE — H&P
H&P Exam - Orthopedics   Eloisa Mcginnis 78 y o  male MRN: [id number]  Unit/Bed#: OR [place] Encounter: [id number]      History of Present Illness   HPI:  Eloisa Mcginnis is a 78 y o  male who presents with Right shoulder pain  Patient injured his right shoulder at work  Patient sustained a cuff tear  Patient presents for surgical repair of his rotator cuff tear  Review of Systems    Historical Information   Past Medical History:   Diagnosis Date    GERD (gastroesophageal reflux disease)     Hypertension     Stomach disorder      Past Surgical History:   Procedure Laterality Date    TONSILLECTOMY       Social History   History   Alcohol Use    1 2 oz/week    2 Cans of beer per week     Comment: daily     History   Drug use: Unknown     History   Smoking Status    Former Smoker   Smokeless Tobacco    Never Used     Family History:   Family History   Problem Relation Age of Onset    Cancer Mother     Lung disease Father    [facility] Cancer Sister        Meds/Allergies   Prescriptions Prior to Admission   Medication    omeprazole (PriLOSEC) 20 mg delayed release capsule    valsartan (DIOVAN) 160 mg tablet    naproxen (NAPROSYN) 500 mg tablet     Current Facility-Administered Medications   Medication Dose Route Frequency Provider Last Rate Last Dose    lactated ringers infusion  125 mL/hr Intravenous Continuous Dominga Keira, CRNA 125 mL/hr at 08/13/18 0815 125 mL/hr at 08/13/18 0815     Allergies   Allergen Reactions    Penicillins Hives       Objective   Vitals: Blood pressure (!) 150/101, pulse 80, temperature (!) 97 3 °F (36 3 °C), temperature source Tympanic, resp  rate 18, SpO2 98 %  ,There is no height or weight on file to calculate BMI  No intake or output data in the 24 hours ending 08/13/18 0850    No intake/output data recorded      Invasive Devices     Peripheral Intravenous Line            Peripheral IV 08/13/18 Left Hand less than 1 day                Physical Exam    awake alert oriented   CVS and RS normal  Ortho Exam    right shoulder cuff 3/5   MRI shows rotator cuff tear    Lab Results: I have personally reviewed pertinent lab results  Imaging: I have personally reviewed pertinent reports  EKG, Pathology, and Other Studies: I have personally reviewed pertinent reports  Code Status: No Order  Advance Directive and Living Will:      Power of :    POLST:      Assessment/Plan     Assessment:   right shoulder rotator cuff tear clinically and by MRI  Plan:   informed consent obtained   operative site marked and signed      Counseling / Coordination of Care  Total floor / unit time spent today 30 minutes  Greater than 50% of total time was spent with the patient and / or family counseling and / or coordination of care  A description of the counseling / coordination of care:  Patient and family  Portions of the record may have been created with voice recognition software   Occasional wrong word or "sound a like" substitutions may have occurred due to the inherent limitations of voice recognition software   Read the chart carefully and recognize, using context, where substitutions have occurred

## 2018-08-13 NOTE — ANESTHESIA PROCEDURE NOTES
Peripheral Block    Start time: 8/13/2018 9:10 AM  Removal time: 8/13/2018 9:25 AM  Reason for block: at surgeon's request and post-op pain management  Staffing  Anesthesiologist: Pilar Toro  Performed: anesthesiologist   Preanesthetic Checklist  Completed: patient identified, site marked, surgical consent, pre-op evaluation, timeout performed, IV checked, risks and benefits discussed and monitors and equipment checked  Peripheral Block  Patient position: supine  Prep: ChloraPrep  Patient monitoring: heart rate, cardiac monitor, continuous pulse ox and frequent blood pressure checks  Block type: interscalene  Laterality: right  Injection technique: single-shot  Procedures: ultrasound guided  Ultrasound permanent image saved  Local infiltration: ropivacaine  Infiltration strength: 0 5 %  Dose: 30 mL  Needle  Needle type: Stimuplex (2 inch)   Assessment  Injection assessment: incremental injection, local visualized surrounding nerve on ultrasound, negative aspiration for CSF, negative aspiration for heme, no paresthesia on injection and transient paresthesias  Paresthesia pain: immediately resolved  Heart rate change: no  Slow fractionated injection: yes  Post-procedure:  site cleaned  patient tolerated the procedure well with no immediate complications

## 2018-08-24 ENCOUNTER — OFFICE VISIT (OUTPATIENT)
Dept: OBGYN CLINIC | Facility: CLINIC | Age: 68
End: 2018-08-24

## 2018-08-24 VITALS
RESPIRATION RATE: 14 BRPM | HEIGHT: 72 IN | SYSTOLIC BLOOD PRESSURE: 161 MMHG | WEIGHT: 217 LBS | HEART RATE: 71 BPM | BODY MASS INDEX: 29.39 KG/M2 | DIASTOLIC BLOOD PRESSURE: 97 MMHG

## 2018-08-24 DIAGNOSIS — Z98.890 STATUS POST ARTHROSCOPY OF RIGHT SHOULDER: Primary | ICD-10-CM

## 2018-08-24 DIAGNOSIS — M75.121 COMPLETE TEAR OF RIGHT ROTATOR CUFF: ICD-10-CM

## 2018-08-24 PROCEDURE — 99024 POSTOP FOLLOW-UP VISIT: CPT | Performed by: ORTHOPAEDIC SURGERY

## 2018-08-24 RX ORDER — TRAMADOL HYDROCHLORIDE 50 MG/1
50 TABLET ORAL EVERY 8 HOURS PRN
Qty: 21 TABLET | Refills: 0 | Status: SHIPPED | OUTPATIENT
Start: 2018-08-24 | End: 2018-08-31

## 2018-08-24 NOTE — LETTER
August 24, 2018     Patient: Cora Goncalves   YOB: 1950   Date of Visit: 8/24/2018       To Whom It May Concern: It is my medical opinion that Cora Goncalves should remain out of work until Next office visit  If you have any questions or concerns, please don't hesitate to call           Sincerely,        Gus Lozada MD    CC: No Recipients

## 2018-08-24 NOTE — PROGRESS NOTES
Chief Complaint  Status post right shoulder arthroscopy repair massive rotator cuff tear    History Of Presenting Illness  Ceferino Beatty 1950 presents with right shoulder arthroscopy rotator cuff repair done on August 13th, 2018  Patient had a massive tear which needed mobilization fix  Patient presents today for 1st postop evaluation  Patient's pain has decreased significantly      Current Medications  Current Outpatient Prescriptions   Medication Sig Dispense Refill    omeprazole (PriLOSEC) 20 mg delayed release capsule Take 20 mg by mouth daily      valsartan (DIOVAN) 160 mg tablet Take 160 mg by mouth daily      oxyCODONE-acetaminophen (PERCOCET) 5-325 mg per tablet Take 1 tablet by mouth every 4 (four) hours as needed for moderate pain for up to 30 doses Max Daily Amount: 6 tablets (Patient not taking: Reported on 8/24/2018 ) 30 tablet 0     No current facility-administered medications for this visit          Current Problems    Active Problems:   Patient Active Problem List    Diagnosis Date Noted    Complete tear of right rotator cuff 05/18/2018         Review of Systems:    General: negative for - chills, fatigue, fever,  weight gain or weight loss      Past Medical History:   Past Medical History:   Diagnosis Date    GERD (gastroesophageal reflux disease)     Hypertension     Stomach disorder        Past Surgical History:   Past Surgical History:   Procedure Laterality Date    [place] SHLDR ARTHROSCOP,SURG,W/ROTAT CUFF REPR Right 8/13/2018    Procedure: SHOULDER ARTHROSCOPY; ROTATOR CUFF REPAIR;  Surgeon: Charlene Herrera MD;  [facility];  Service: Orthopedics    TONSILLECTOMY         Family History:  Family history reviewed and non-contributory  Family History   Problem Relation Age of Onset    Cancer Mother     Lung disease Father     Cancer Sister        Social History:  Social History     Social History    Marital status: /Civil Union     Spouse name: N/A    Number of children: N/A    Years of education: N/A     Social History Main Topics    Smoking status: Former Smoker    Smokeless tobacco: Never Used    Alcohol use 1 2 oz/week     2 Cans of beer per week      Comment: daily    Drug use: No    Sexual activity: Not Asked     Other Topics Concern    None     Social History Narrative    None       Allergies: Allergies   Allergen Reactions    Penicillins Hives           Physical ExaminationBP 161/97 (BP Location: Left arm, Patient Position: Sitting, Cuff Size: Large)   Pulse 71   Resp 14   Ht 6' (1 829 m)   Wt 98 4 kg (217 lb)   BMI 29 43 kg/m²   Gen: Alert and oriented to person, place, time  HEENT: EOMI, eyes clear, moist mucus membranes, hearing intact      Orthopedic Exam  Right shoulder incisions healed sutures removed  No distal neurovascular deficits          Impression  Stable status post right shoulder rotator cuff repair        Plan    Discussed operative findings and treatment with the patient  Patient will have to be very diligent with the postop protocol a copy of which has been provided to him for massive cuff tear repair  Patient will not be released to work for good 6-12 weeks  Patient will start physical therapy as per the protocol for massive cuff tears  Follow-up in 4 weeks    Bill Jeffery MD        Portions of the record may have been created with voice recognition software   Occasional wrong word or "sound a like" substitutions may have occurred due to the inherent limitations of voice recognition software   Read the chart carefully and recognize, using context, where substitutions have occurred

## 2018-09-14 ENCOUNTER — TELEPHONE (OUTPATIENT)
Dept: OBGYN CLINIC | Facility: HOSPITAL | Age: 68
End: 2018-09-14

## 2018-09-14 NOTE — TELEPHONE ENCOUNTER
Patient had surgery with Dr Rosmery Parker on 08/13  His employer is asking for a note estimating how long he will be out of work    Please fax to #[phone], attention tabby

## 2018-09-21 ENCOUNTER — OFFICE VISIT (OUTPATIENT)
Dept: OBGYN CLINIC | Facility: CLINIC | Age: 68
End: 2018-09-21

## 2018-09-21 VITALS
BODY MASS INDEX: 29.39 KG/M2 | WEIGHT: 217 LBS | HEIGHT: 72 IN | HEART RATE: 67 BPM | DIASTOLIC BLOOD PRESSURE: 109 MMHG | SYSTOLIC BLOOD PRESSURE: 180 MMHG

## 2018-09-21 DIAGNOSIS — Z98.890 STATUS POST ARTHROSCOPY OF RIGHT SHOULDER: Primary | ICD-10-CM

## 2018-09-21 PROCEDURE — 99024 POSTOP FOLLOW-UP VISIT: CPT | Performed by: ORTHOPAEDIC SURGERY

## 2018-09-21 NOTE — LETTER
September 21, 2018     Patient: Michelle Martin   YOB: 1950   Date of Visit: 9/21/2018       To Whom It May Concern: It is my medical opinion that Michelle Martin should remain out of work until Next office visit  If you have any questions or concerns, please don't hesitate to call           Sincerely,        Clary Ramirez MD    CC: Michelle Ora

## 2018-09-21 NOTE — PROGRESS NOTES
Chief Complaint  Status post right shoulder arthroscopy rotator cuff repair    History Of Presenting Illness  Manuel Franco 1950 presents with right shoulder arthroscopy repair of massive rotator cuff on August 13th, 2018  Patient presents for postop visit 1 month after surgery  Patient making slow but steady progress      Current Medications  Current Outpatient Prescriptions   Medication Sig Dispense Refill    omeprazole (PriLOSEC) 20 mg delayed release capsule Take 20 mg by mouth daily      valsartan (DIOVAN) 160 mg tablet Take 160 mg by mouth daily       No current facility-administered medications for this visit          Current Problems    Active Problems:   Patient Active Problem List    Diagnosis Date Noted    Complete tear of right rotator cuff 05/18/2018         Review of Systems:    General: negative for - chills, fatigue, fever,  weight gain or weight loss  Psychological: negative for - anxiety, behavioral disorder, concentration difficulties  Ophthalmic: negative for - blurry vision, decreased vision, double vision,   coordination/balance, memory loss, numbness/tingling, seizures    Past Medical History:   Past Medical History:   Diagnosis Date    GERD (gastroesophageal reflux disease)     Hypertension     Stomach disorder        Past Surgical History:   Past Surgical History:   Procedure Laterality Date    [place] SHLDR ARTHROSCOP,SURG,W/ROTAT CUFF REPR Right 8/13/2018    Procedure: SHOULDER ARTHROSCOPY; ROTATOR CUFF REPAIR;  Surgeon: Umm Kumar MD;  Location: [facility];  Service: Orthopedics    TONSILLECTOMY         Family History:  Family history reviewed and non-contributory  Family History   Problem Relation Age of Onset    Cancer Mother     Lung disease Father     Cancer Sister        Social History:  Social History     Social History    Marital status: /Civil Union     Spouse name: N/A    Number of children: N/A    Years of education: N/A     Social History Main Topics    Smoking status: Former Smoker    Smokeless tobacco: Never Used    Alcohol use 1 2 oz/week     2 Cans of beer per week      Comment: daily    Drug use: No    Sexual activity: Not Asked     Other Topics Concern    None     Social History Narrative    None       Allergies: Allergies   Allergen Reactions    Penicillins Hives           Physical ExaminationBP (!) 180/109   Pulse 67   Ht 6' (1 829 m)   Wt 98 4 kg (217 lb)   BMI 29 43 kg/m²   Gen: Alert and oriented to person, place, time  HEENT: EOMI, eyes clear, moist mucus membranes, hearing intact    Orthopedic Exam  Right shoulder incisions healed  No distal neurovascular deficits  Cuff repair is intact  Patient is regained 50% movement with 3/5 strength          Impression  Patient improving status post right shoulder rotator cuff repair          Plan    Patient will need to continue intensive physical therapy with strengthening and range of motion  Follow-up in 4 weeks  Patient still not release to go back to work    Gus Lozada MD        Portions of the record may have been created with voice recognition software   Occasional wrong word or "sound a like" substitutions may have occurred due to the inherent limitations of voice recognition software   Read the chart carefully and recognize, using context, where substitutions have occurred

## 2018-10-26 ENCOUNTER — OFFICE VISIT (OUTPATIENT)
Dept: OBGYN CLINIC | Facility: CLINIC | Age: 68
End: 2018-10-26

## 2018-10-26 VITALS
WEIGHT: 221 LBS | HEIGHT: 72 IN | HEART RATE: 66 BPM | BODY MASS INDEX: 29.93 KG/M2 | SYSTOLIC BLOOD PRESSURE: 172 MMHG | DIASTOLIC BLOOD PRESSURE: 92 MMHG

## 2018-10-26 DIAGNOSIS — Z98.890 STATUS POST ARTHROSCOPY OF RIGHT SHOULDER: Primary | ICD-10-CM

## 2018-10-26 PROCEDURE — 99024 POSTOP FOLLOW-UP VISIT: CPT | Performed by: ORTHOPAEDIC SURGERY

## 2018-10-26 NOTE — LETTER
October 26, 2018     Patient: Cam Ohara   YOB: 1950   Date of Visit: 10/26/2018       To Whom It May Concern: It is my medical opinion that Cam Ohara should remain out of work until next [employer] visit  If you have any questions or concerns, please don't hesitate to call           Sincerely,        Daniel Lopez MD    CC: Cam Mayda

## 2018-10-26 NOTE — PROGRESS NOTES
Chief Complaint  Status post right shoulder arthroscopy    History Of Presenting Illness  Estee King 1950 presents with right shoulder arthroscopy rotator cuff repair in August 13th, 2018  Patient presents for postop visit follow-up  Pain has decreased in the shoulder  Range of motion has improved  Patient is undergoing postoperative physical therapy      Current Medications  Current Outpatient Prescriptions   Medication Sig Dispense Refill    omeprazole (PriLOSEC) 20 mg delayed release capsule Take 20 mg by mouth daily      valsartan (DIOVAN) 160 mg tablet Take 160 mg by mouth daily       No current facility-administered medications for this visit          Current Problems    Active Problems:   Patient Active Problem List    Diagnosis Date Noted    Complete tear of right rotator cuff 05/18/2018         Review of Systems:    General: negative for - chills, fatigue, fever,  weight gain or weight loss  Psychological: negative for - anxiety, behavioral disorder, concentration difficulties      Past Medical History:   Past Medical History:   Diagnosis Date    GERD (gastroesophageal reflux disease)     Hypertension     Stomach disorder        Past Surgical History:   Past Surgical History:   Procedure Laterality Date    [place] SHLDR ARTHROSCOP,SURG,W/ROTAT CUFF REPR Right 8/13/2018    Procedure: SHOULDER ARTHROSCOPY; ROTATOR CUFF REPAIR;  Surgeon: Shyam Campa MD;  Location: [facility];  Service: Orthopedics    TONSILLECTOMY         Family History:  Family history reviewed and non-contributory  Family History   Problem Relation Age of Onset    Cancer Mother     Lung disease Father     Cancer Sister        Social History:  Social History     Social History    Marital status: /Civil Union     Spouse name: N/A    Number of children: N/A    Years of education: N/A     Social History Main Topics    Smoking status: Former Smoker    Smokeless tobacco: Never Used    Alcohol use 1 2 oz/week     2 Cans of beer per week      Comment: daily    Drug use: No    Sexual activity: Not on file     Other Topics Concern    Not on file     Social History Narrative    No narrative on file       Allergies: Allergies   Allergen Reactions    Penicillins Hives           Physical ExaminationThere were no vitals taken for this visit  Gen: Alert and oriented to person, place, time      Orthopedic Exam  Right shoulder examination  All portal sites well healed  No distal neurovascular deficits  Follow-up flexion 130°  AB duction 120°  Cuff strength 4 out of 5          Impression  Slow progress status post rotator cuff repair right shoulder            Plan    Continue intensive physical therapy  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

## (undated) DEVICE — SUT ETHILON 3-0 PS-1 18 IN 1663H

## (undated) DEVICE — NEEDLE SUT SCORPION MULTIFIRE

## (undated) DEVICE — 3M™ IOBAN™ 2 ANTIMICROBIAL INCISE DRAPE 6648EZ: Brand: IOBAN™ 2

## (undated) DEVICE — CHLORAPREP HI-LITE 26ML ORANGE

## (undated) DEVICE — GLOVE INDICATOR PI UNDERGLOVE SZ 8 BLUE

## (undated) DEVICE — TRANSPOSAL ULTRAFLEX DUO/QUAD ULTRA CART MANIFOLD

## (undated) DEVICE — MAT FLOOR STEP DRI 24 X 36 IN N-STRL

## (undated) DEVICE — ALL PURPOSE SPONGES,NON-WOVEN, 4 PLY: Brand: CURITY

## (undated) DEVICE — DRY-DOC CANNULA WITH DISPOSABLE OBTURATOR, 8.0 X 85 MM: Brand: DRY-DOC

## (undated) DEVICE — OCCLUSIVE GAUZE STRIP,3% BISMUTH TRIBROMOPHENATE IN PETROLATUM BLEND: Brand: XEROFORM

## (undated) DEVICE — SHOULDER SUSPENSION KIT 6 PER BOX

## (undated) DEVICE — SPONGE LAP 18 X 18 IN

## (undated) DEVICE — BLADE SHAVER EXCALIBUR 4MM 13CM COOLCUT

## (undated) DEVICE — SYRINGE 20ML LL

## (undated) DEVICE — NEEDLE 21 G X 1

## (undated) DEVICE — LIGHT GLOVE GREEN

## (undated) DEVICE — SKIN MARKER DUAL TIP WITH RULER CAP, FLEXIBLE RULER AND LABELS: Brand: DEVON

## (undated) DEVICE — GLOVE SRG BIOGEL 8

## (undated) DEVICE — SURGI KIT INSTRUMENT ORGANIZER

## (undated) DEVICE — THREE-QUARTER SHEET: Brand: CONVERTORS

## (undated) DEVICE — TUBING ARTHROSCOPIC WAVE  MAIN PUMP

## (undated) DEVICE — INTENDED FOR TISSUE SEPARATION, AND OTHER PROCEDURES THAT REQUIRE A SHARP SURGICAL BLADE TO PUNCTURE OR CUT.: Brand: BARD-PARKER ® CARBON RIB-BACK BLADES

## (undated) DEVICE — SURGICAL GOWN, XL SMARTSLEEVE: Brand: CONVERTORS

## (undated) DEVICE — TUBING SUCTION 5MM X 12 FT

## (undated) DEVICE — 3M™ STERI-DRAPE™ U-DRAPE 1015: Brand: STERI-DRAPE™

## (undated) DEVICE — CANNULA 7 X70MM THRD SEAL SIDE PORT

## (undated) DEVICE — PACK UNIVERSAL ARTHRSCOPY PBDS

## (undated) DEVICE — ELECTRODE ELECSURG SUPER TURBOVAC 90 3.75MM X 5.4 IN

## (undated) DEVICE — ABTHERA OPEN ABDOMEN DRESSING: Brand: ABTHERA™

## (undated) DEVICE — NEEDLE SPINAL18G X 3.5 IN QUINCKE

## (undated) DEVICE — SPONGE DRAIN 4 X 4

## (undated) DEVICE — 1820 FOAM BLOCK NEEDLE COUNTER: Brand: DEVON

## (undated) DEVICE — SUT FIBERWIRE #2 1/2 CIRCLE T-5 38IN AR-7200

## (undated) DEVICE — ABDOMINAL PAD: Brand: DERMACEA